# Patient Record
Sex: MALE
[De-identification: names, ages, dates, MRNs, and addresses within clinical notes are randomized per-mention and may not be internally consistent; named-entity substitution may affect disease eponyms.]

---

## 2018-05-07 ENCOUNTER — HOSPITAL ENCOUNTER (OUTPATIENT)
Dept: HOSPITAL 5 - MRI | Age: 78
Discharge: HOME | End: 2018-05-07
Attending: PSYCHIATRY & NEUROLOGY
Payer: MEDICARE

## 2018-05-07 DIAGNOSIS — R26.2: ICD-10-CM

## 2018-05-07 DIAGNOSIS — J32.0: Primary | ICD-10-CM

## 2018-05-07 DIAGNOSIS — G31.9: ICD-10-CM

## 2018-05-07 DIAGNOSIS — I99.8: ICD-10-CM

## 2018-05-07 DIAGNOSIS — R55: ICD-10-CM

## 2018-05-07 PROCEDURE — 70551 MRI BRAIN STEM W/O DYE: CPT

## 2018-05-07 PROCEDURE — 70544 MR ANGIOGRAPHY HEAD W/O DYE: CPT

## 2018-05-10 NOTE — MAGNETIC RESONANCE REPORT
MR angiogram was performed of the intracranial circulation



3-D time-of-flight spoiled grass images were obtained of the 

intracranial circulation.



The images of the carotid arteries showed no areas of occlusion or 

aneurysmal dilatation.  The vertebral basilar system was also patent 

without any evidence of occlusive disease.

The left vertebral was not seen, a normal variation.



Impression:

Normal MR angiogram of the intracranial circulation.

## 2018-05-10 NOTE — MAGNETIC RESONANCE REPORT
MR scan of the cranium was performed without contrast.

Pulse sequences included:

1.  T1 weighted sagittal and axial images without contrast 

2.  T2 weighted axial and coronal images

3.  FLAIR axial images

4.  Diffusion-weighted axial images

5.  Apparent diffusion coefficient images



Views of the posterior fossa showed a normal craniocervical junction.  

Cerebellar pontine angles were normal with normal seventh-eighth nerve 

complexes.  Brainstem and cerebellum were normal.



The ventricular system showed no dilatation or distortion.



Images of the hemispheres showed no areas of increased or decreased 

signal.  Some mild cortical atrophy was seen not unusual at this age. 



Sinuses showed increased signal in both maxillary sinuses consistent 

with sinusitis.  Flow voids in the [place] including vertebral 

and basilar arteries,  orbits, pituitary and basal ganglia were normal.





Impression:



Normal MR scan of the cranium without contrast for age except for 

bilateral maxillary sinusitis